# Patient Record
Sex: FEMALE | Race: OTHER | NOT HISPANIC OR LATINO
[De-identification: names, ages, dates, MRNs, and addresses within clinical notes are randomized per-mention and may not be internally consistent; named-entity substitution may affect disease eponyms.]

---

## 2017-12-09 PROBLEM — Z00.00 ENCOUNTER FOR PREVENTIVE HEALTH EXAMINATION: Status: ACTIVE | Noted: 2017-12-09

## 2018-01-08 ENCOUNTER — APPOINTMENT (OUTPATIENT)
Dept: OPHTHALMOLOGY | Facility: CLINIC | Age: 78
End: 2018-01-08
Payer: MEDICARE

## 2018-01-08 PROCEDURE — 92012 INTRM OPH EXAM EST PATIENT: CPT

## 2018-12-10 ENCOUNTER — APPOINTMENT (OUTPATIENT)
Dept: OPHTHALMOLOGY | Facility: CLINIC | Age: 78
End: 2018-12-10

## 2019-02-11 ENCOUNTER — APPOINTMENT (OUTPATIENT)
Dept: OPHTHALMOLOGY | Facility: CLINIC | Age: 79
End: 2019-02-11

## 2020-03-09 ENCOUNTER — NON-APPOINTMENT (OUTPATIENT)
Age: 80
End: 2020-03-09

## 2020-03-09 ENCOUNTER — APPOINTMENT (OUTPATIENT)
Dept: OPHTHALMOLOGY | Facility: CLINIC | Age: 80
End: 2020-03-09
Payer: MEDICARE

## 2020-03-09 PROCEDURE — 92014 COMPRE OPH EXAM EST PT 1/>: CPT

## 2020-03-09 PROCEDURE — 92201 OPSCPY EXTND RTA DRAW UNI/BI: CPT

## 2021-04-19 ENCOUNTER — APPOINTMENT (OUTPATIENT)
Dept: OPHTHALMOLOGY | Facility: CLINIC | Age: 81
End: 2021-04-19
Payer: MEDICARE

## 2021-04-19 ENCOUNTER — NON-APPOINTMENT (OUTPATIENT)
Age: 81
End: 2021-04-19

## 2021-04-19 PROCEDURE — 92201 OPSCPY EXTND RTA DRAW UNI/BI: CPT

## 2021-04-19 PROCEDURE — 92014 COMPRE OPH EXAM EST PT 1/>: CPT

## 2022-05-23 ENCOUNTER — NON-APPOINTMENT (OUTPATIENT)
Age: 82
End: 2022-05-23

## 2022-05-23 ENCOUNTER — APPOINTMENT (OUTPATIENT)
Dept: OPHTHALMOLOGY | Facility: CLINIC | Age: 82
End: 2022-05-23
Payer: MEDICARE

## 2022-05-23 PROCEDURE — 92014 COMPRE OPH EXAM EST PT 1/>: CPT

## 2022-05-23 PROCEDURE — 92201 OPSCPY EXTND RTA DRAW UNI/BI: CPT

## 2024-03-25 ENCOUNTER — APPOINTMENT (OUTPATIENT)
Dept: PULMONOLOGY | Facility: CLINIC | Age: 84
End: 2024-03-25
Payer: MEDICARE

## 2024-03-25 ENCOUNTER — NON-APPOINTMENT (OUTPATIENT)
Age: 84
End: 2024-03-25

## 2024-03-25 VITALS
DIASTOLIC BLOOD PRESSURE: 89 MMHG | SYSTOLIC BLOOD PRESSURE: 138 MMHG | WEIGHT: 100 LBS | OXYGEN SATURATION: 91 % | HEIGHT: 65 IN | BODY MASS INDEX: 16.66 KG/M2 | HEART RATE: 115 BPM

## 2024-03-25 DIAGNOSIS — Z01.812 ENCOUNTER FOR PREPROCEDURAL LABORATORY EXAMINATION: ICD-10-CM

## 2024-03-25 DIAGNOSIS — E78.5 HYPERLIPIDEMIA, UNSPECIFIED: ICD-10-CM

## 2024-03-25 DIAGNOSIS — E03.9 HYPOTHYROIDISM, UNSPECIFIED: ICD-10-CM

## 2024-03-25 LAB — POCT - HEMOGLOBIN (HGB), QUANTITATIVE, TRANSCUTANEOUS: 15.3

## 2024-03-25 PROCEDURE — 94060 EVALUATION OF WHEEZING: CPT

## 2024-03-25 PROCEDURE — ZZZZZ: CPT

## 2024-03-25 PROCEDURE — 94664 DEMO&/EVAL PT USE INHALER: CPT | Mod: 59

## 2024-03-25 PROCEDURE — 94729 DIFFUSING CAPACITY: CPT

## 2024-03-25 PROCEDURE — 71046 X-RAY EXAM CHEST 2 VIEWS: CPT

## 2024-03-25 PROCEDURE — 94618 PULMONARY STRESS TESTING: CPT

## 2024-03-25 PROCEDURE — 99204 OFFICE O/P NEW MOD 45 MIN: CPT | Mod: 25

## 2024-03-25 PROCEDURE — 94726 PLETHYSMOGRAPHY LUNG VOLUMES: CPT

## 2024-03-25 PROCEDURE — 88738 HGB QUANT TRANSCUTANEOUS: CPT

## 2024-03-25 RX ORDER — LEVOTHYROXINE SODIUM 0.07 MG/1
75 TABLET ORAL
Refills: 0 | Status: ACTIVE | COMMUNITY
Start: 2024-03-25

## 2024-03-25 RX ORDER — SIMVASTATIN 20 MG/1
20 TABLET, FILM COATED ORAL
Refills: 0 | Status: ACTIVE | COMMUNITY
Start: 2024-03-25

## 2024-03-25 NOTE — ASSESSMENT
[FreeTextEntry1] : Ms. VALENTINE IGLESIAS is an 84 year old female, history of emphysema And chronic shortness of breath.  Reports severe dyspnea.  Has some airways disease on PFT but majority of problem appears to be related to severe gas exchange impairment.  Likely has severe emphysema.  Her x-ray shows inhomogeneous Emphysema with upper lobe predominance.  Note the large discrepancy between nitrogen lung volumes and box volumes suggesting the presence of noncommunicating gas within the thorax.  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 She may be a candidate for bullectomy or endobronchial valve placement.  Recommend chest CT for further evaluation.Did give patient trial of inhalers.  Trelegy samples provided and patient instructed in use.

## 2024-03-25 NOTE — REASON FOR VISIT
[Initial] : an initial visit [Shortness of Breath] : shortness of breath [COPD] : COPD [Emphysema] : emphysema [TextBox_44] : Emphysema

## 2024-03-25 NOTE — PROCEDURE
[FreeTextEntry1] : Chest x-ray demonstrates severe upper lobe emphysema right greater than left no change from outside chest x-ray 2019 reviewed.  6-minute walk demonstrates minimal O2 desaturation on exertion.  Venipuncture with labs drawn in office.   PFT demonstrates Moderate airways obstruction and severe gas exchange impairment. Lung volumes performed with nitrogen technique and box technique showed a significant discordance with significantly higher lung volumes noted with box technique

## 2024-03-25 NOTE — HISTORY OF PRESENT ILLNESS
[Former] : former [Never] : never [TextBox_4] : VALENTINE IGLESIAS is a 84 year female, with history of emphysema, who presents to the office for an initial evaluation. Patient reports of having worsening dyspnea on exertion that started 6 months ago. She denies of having any symptoms of a cough. Patient reports that her symptoms were not significant prior to 6 months ago. She states that he was never advised to use an O2 concentrator. Patient denies of having any known Hx of pneumonia. Patient denies of having any known drug related allergies. Recently received a cardiac evaluation which was unremarkable.She was told of a diagnosis of emphysema several years ago and she is a former heavy smoker. [TextBox_13] : >40 [YearQuit] : 2021

## 2024-03-25 NOTE — ADDENDUM
[FreeTextEntry1] : I, Serjioayala Yuliya, acted solely as a scribe for Dr. Sedrick Lozano M.D. on this date 03/25/2024.   All medical record entries made by the Scribe were at my, Dr. Sedrick Lozano M.D., direction and personally dictated by me on 03/25/2024. I have reviewed the chart and agree that the record accurately reflects my personal performance of the history, physical exam, assessment and plan. I have also personally directed, reviewed, and agreed with the chart.

## 2024-03-26 ENCOUNTER — NON-APPOINTMENT (OUTPATIENT)
Age: 84
End: 2024-03-26

## 2024-03-26 LAB — A1AT SERPL-MCNC: 214 MG/DL

## 2024-03-27 LAB — NT-PROBNP SERPL-MCNC: 192 PG/ML

## 2024-04-03 ENCOUNTER — APPOINTMENT (OUTPATIENT)
Dept: PULMONOLOGY | Facility: CLINIC | Age: 84
End: 2024-04-03

## 2024-04-08 LAB
ANNOTATION COMMENT IMP: NORMAL
ELECTRONIC SIGNATURE: NORMAL
SERPINA1 GENE MUT TESTED BLD/T: NORMAL

## 2024-04-11 ENCOUNTER — OUTPATIENT (OUTPATIENT)
Dept: OUTPATIENT SERVICES | Facility: HOSPITAL | Age: 84
LOS: 1 days | End: 2024-04-11
Payer: MEDICARE

## 2024-04-11 ENCOUNTER — APPOINTMENT (OUTPATIENT)
Dept: CT IMAGING | Facility: IMAGING CENTER | Age: 84
End: 2024-04-11
Payer: MEDICARE

## 2024-04-11 DIAGNOSIS — R06.02 SHORTNESS OF BREATH: ICD-10-CM

## 2024-04-11 PROCEDURE — 71250 CT THORAX DX C-: CPT

## 2024-04-11 PROCEDURE — 71250 CT THORAX DX C-: CPT | Mod: 26,MH

## 2024-04-21 ENCOUNTER — NON-APPOINTMENT (OUTPATIENT)
Age: 84
End: 2024-04-21

## 2024-05-02 ENCOUNTER — APPOINTMENT (OUTPATIENT)
Dept: PULMONOLOGY | Facility: CLINIC | Age: 84
End: 2024-05-02
Payer: MEDICARE

## 2024-05-02 VITALS
DIASTOLIC BLOOD PRESSURE: 80 MMHG | OXYGEN SATURATION: 91 % | RESPIRATION RATE: 16 BRPM | HEART RATE: 100 BPM | SYSTOLIC BLOOD PRESSURE: 128 MMHG

## 2024-05-02 PROCEDURE — 94010 BREATHING CAPACITY TEST: CPT

## 2024-05-02 PROCEDURE — 99213 OFFICE O/P EST LOW 20 MIN: CPT | Mod: 25

## 2024-05-02 NOTE — REASON FOR VISIT
[Follow-Up] : a follow-up visit [COPD] : COPD [Emphysema] : emphysema [Shortness of Breath] : shortness of breath [Abnormal CXR/ Chest CT] : an abnormal CXR/ chest CT

## 2024-05-03 NOTE — PROCEDURE
[FreeTextEntry1] : Spirometry demonstrates small interval improvement compared to prior.  Chest CT reviewed extensive upper lobe emphysematous and bullous changes with compressive atelectasis at bases

## 2024-05-03 NOTE — ADDENDUM
[FreeTextEntry1] : I, Serjioaayla Yuliya, acted solely as a scribe for Dr. Sedrick Lozano M.D. on this date 05/02/2024.   All medical record entries made by the Scribe were at my, Dr. Sedrick Lozano M.D., direction and personally dictated by me on 05/02/2024. I have reviewed the chart and agree that the record accurately reflects my personal performance of the history, physical exam, assessment and plan. I have also personally directed, reviewed, and agreed with the chart.

## 2024-05-03 NOTE — HISTORY OF PRESENT ILLNESS
[Former] : former [Never] : never [TextBox_4] : Prior: Patient reports of having worsening dyspnea on exertion that started 6 months ago. She denies of having any symptoms of a cough. Patient reports that her symptoms were not significant prior to 6 months ago. She states that he was never advised to use an O2 concentrator. Patient denies of having any known Hx of pneumonia. Patient denies of having any known drug related allergies. Recently received a cardiac evaluation which was unremarkable.She was told of a diagnosis of emphysema several years ago and she is a former heavy smoker.  Current: VALENTINE IGLESIAS is a 84 year old female, with history of emphysema, who presents to the office for follow up evaluation. Patient reports that she continues to have symptoms of dyspnea on exertion which has not changed since her last visit. Patient denies of having any symptoms of phlegm or mucus. She states that she continues to take Trelegy on a daily basis with minimal change to her respiratory symptoms. [TextBox_13] : >40 [YearQuit] : 2021

## 2024-05-03 NOTE — ASSESSMENT
[FreeTextEntry1] : Ms. VALENTINE IGLESIAS is an 84 year old female, history of emphysema. Improved lung function indicated in spirometry today although continues to have symptoms of dyspnea on exertion after trial of Trelegy. Continue Trelegy 100 mcg 1 puff once a day for COPD treatment unless it is cost prohibitive.   Has extensive upper lobe emphysematous changes with compressive atelectasis at the bases.  Will have imaging reviewed by interventional pulmonary for possibility of endobronchial valve treatment and will refer if appropriate  Follow up in 3 months.

## 2024-05-20 ENCOUNTER — APPOINTMENT (OUTPATIENT)
Dept: PULMONOLOGY | Facility: CLINIC | Age: 84
End: 2024-05-20
Payer: MEDICARE

## 2024-05-20 VITALS
RESPIRATION RATE: 14 BRPM | OXYGEN SATURATION: 91 % | HEIGHT: 64 IN | WEIGHT: 99 LBS | DIASTOLIC BLOOD PRESSURE: 69 MMHG | BODY MASS INDEX: 16.9 KG/M2 | TEMPERATURE: 97.3 F | HEART RATE: 97 BPM | SYSTOLIC BLOOD PRESSURE: 104 MMHG

## 2024-05-20 DIAGNOSIS — R06.02 SHORTNESS OF BREATH: ICD-10-CM

## 2024-05-20 DIAGNOSIS — J43.2 CENTRILOBULAR EMPHYSEMA: ICD-10-CM

## 2024-05-20 PROCEDURE — 99205 OFFICE O/P NEW HI 60 MIN: CPT

## 2024-06-14 RX ORDER — FLUTICASONE FUROATE, UMECLIDINIUM BROMIDE AND VILANTEROL TRIFENATATE 100; 62.5; 25 UG/1; UG/1; UG/1
100-62.5-25 POWDER RESPIRATORY (INHALATION)
Qty: 1 | Refills: 3 | Status: ACTIVE | COMMUNITY
Start: 2024-03-25 | End: 1900-01-01

## 2024-06-24 NOTE — HISTORY OF PRESENT ILLNESS
[Former] : former [>= 20 pack years] : >= 20 pack years [TextBox_4] : Interventional Pulmonology Consultation/Visit Note   85 y/o F with with past medical history of COPD and advanced emphysema who presents for a consultation for bronchoscopic lung volume reduction for emphysema. The patient is currently being managed for COPD. In terms of medical management, the patient is already optimized on medical management. They are currently on a combination therapy of LABA/ICS/LAMA. Patient still continues to feel short of breath and dyspneic despite medical optimization, that is not attributable to any other etiologies. Thus, the patient was referred to assess candidacy for bronchoscopic lung volume reduction for dyspnea and symptomatic benefit.   History of exacerbations: No recent History of thoracic surgical interventions: No History of pneumothorax: No Pulmonary rehabilitation: No [TextBox_13] : >40 [YearQuit] : 2021

## 2024-06-24 NOTE — CONSULT LETTER
[Dear  ___] : Dear  [unfilled], [Consult Letter:] : I had the pleasure of evaluating your patient, [unfilled]. [Please see my note below.] : Please see my note below. [Consult Closing:] : Thank you very much for allowing me to participate in the care of this patient.  If you have any questions, please do not hesitate to contact me. [Sincerely,] : Sincerely, [FreeTextEntry3] : Nilesh Giron MD Director of Interventional Pulmonology & Bronchoscopy Associate Professor of Medicine, Cardiovascular & Thoracic Surgery Division of Pulmonary, Critical Care & Sleep Medicine [facility].

## 2024-06-24 NOTE — REASON FOR VISIT
[Consultation] : a consultation [TextBox_44] : IP evaluation for bronchoscopic lung volume reduction [TextBox_13] : Dr. Lozano

## 2024-06-24 NOTE — ASSESSMENT
[FreeTextEntry1] :   Patient selection criteria for Endobronchial/Intrabronchial Valve for Bronchoscopic Lung Volume Reduction:  Emphysema by CT: Yes Stable with <20mg of prednisone daily: Yes RV: >175% predicted (>200% if homogenous) - 153% FEV1: 15-45% predicted - 49% TLC: >100% predicted. - 104% 6MWD: 100-500m - TBD Smoking Status:  Former StratX/SeleCT report for fissue integrity: >95% Homogenous vs Heterogenous: Heterogenous PcO2: TBD Echo: Pending VQ: Pending  Current devices (Zephry/Spiration valves) to achieve bronchoscopic lung volume reduction were discussed with the patient and family. In addition, the procedure, pre-operative evaluation, post-operative care was discussed. The benefits of the procedure including improvement in FEV1, 6MWD, SGRQ based on current studies were discussed. Also, complications associated with the procedure including pneumothorax and post-operative admission for minimum 3 days for the monitoring was discussed. Need for further procedures including valve removal if no efficacy or complications was discussed. Clinical benefit based on studies despite PTX was discussed. Patient/family demonstrated understanding. All questions were answered. Patient/family agreeable to proceed with evaluation to assess candidacy.  We will obtain VQ scan based on StratX report to assess the appropriate target given significant bilateral upper lobar emphysema with integral fissure on the right and left. In addition, we will obtain 6MWD as well as echo to r/o PHTN. She will eventually need evaluation for PCO2 as well via ABG to ensure no hypercapnia.   RTC once above completed to further assess candidacy. All questions answered.

## 2024-08-06 ENCOUNTER — APPOINTMENT (OUTPATIENT)
Dept: PULMONOLOGY | Facility: CLINIC | Age: 84
End: 2024-08-06

## 2024-08-06 PROCEDURE — 94010 BREATHING CAPACITY TEST: CPT

## 2024-08-06 PROCEDURE — 99213 OFFICE O/P EST LOW 20 MIN: CPT | Mod: 25

## 2024-08-06 NOTE — ADDENDUM
[FreeTextEntry1] : I, Serjioayala Yuliya, acted solely as a scribe for Dr. Sedrick Lozano M.D. on this date 05/02/2024.   All medical record entries made by the Scribe were at my, Dr. Sedrick Lozano M.D., direction and personally dictated by me on 05/02/2024. I have reviewed the chart and agree that the record accurately reflects my personal performance of the history, physical exam, assessment and plan. I have also personally directed, reviewed, and agreed with the chart.

## 2024-08-06 NOTE — ASSESSMENT
[FreeTextEntry1] : Ms. VALENTINE IGLESIAS is an 84 year old female, history of emphysema. Improved lung function indicated in spirometry today although continues to have symptoms of dyspnea on exertion after trial of Trelegy. Continue Trelegy 100 mcg 1 puff once a day for COPD treatment unless it is cost prohibitive.   Has decided not to pursue valve treatment at this time.  Follow-up in 3

## 2024-08-06 NOTE — HISTORY OF PRESENT ILLNESS
[Former] : former [Never] : never [TextBox_4] : Prior: Patient reports of having worsening dyspnea on exertion that started 6 months ago. She denies of having any symptoms of a cough. Patient reports that her symptoms were not significant prior to 6 months ago. She states that he was never advised to use an O2 concentrator. Patient denies of having any known Hx of pneumonia. Patient denies of having any known drug related allergies. Recently received a cardiac evaluation which was unremarkable.She was told of a diagnosis of emphysema several years ago and she is a former heavy smoker.  Last office visit VALENTINE IGLESIAS is a 84 year old female, with history of emphysema, who presents to the office for follow up evaluation. Patient reports that she continues to have symptoms of dyspnea on exertion which has not changed since her last visit. Patient denies of having any symptoms of phlegm or mucus. She states that she continues to take Trelegy on a daily basis with minimal change to her respiratory symptoms.  Current: Continues to benefit from Trelegy reports shortness of breath is stable concerned about cost issue but does not have alternative [YearQuit] : 2021 [TextBox_13] : >40

## 2024-08-06 NOTE — REASON FOR VISIT
[Follow-Up] : a follow-up visit [Abnormal CXR/ Chest CT] : an abnormal CXR/ chest CT [COPD] : COPD [Emphysema] : emphysema [Shortness of Breath] : shortness of breath

## 2024-11-05 ENCOUNTER — APPOINTMENT (OUTPATIENT)
Dept: PULMONOLOGY | Facility: CLINIC | Age: 84
End: 2024-11-05
Payer: MEDICARE

## 2024-11-05 VITALS — DIASTOLIC BLOOD PRESSURE: 91 MMHG | HEART RATE: 81 BPM | OXYGEN SATURATION: 93 % | SYSTOLIC BLOOD PRESSURE: 155 MMHG

## 2024-11-05 DIAGNOSIS — J43.2 CENTRILOBULAR EMPHYSEMA: ICD-10-CM

## 2024-11-05 DIAGNOSIS — T30.0 BURN OF UNSPECIFIED BODY REGION, UNSPECIFIED DEGREE: ICD-10-CM

## 2024-11-05 DIAGNOSIS — R06.02 SHORTNESS OF BREATH: ICD-10-CM

## 2024-11-05 DIAGNOSIS — R05.2 SUBACUTE COUGH: ICD-10-CM

## 2024-11-05 DIAGNOSIS — R93.89 ABNORMAL FINDINGS ON DIAGNOSTIC IMAGING OF OTHER SPECIFIED BODY STRUCTURES: ICD-10-CM

## 2024-11-05 DIAGNOSIS — R05.3 CHRONIC COUGH: ICD-10-CM

## 2024-11-05 LAB — POCT - HEMOGLOBIN (HGB), QUANTITATIVE, TRANSCUTANEOUS: 12.3

## 2024-11-05 PROCEDURE — 94729 DIFFUSING CAPACITY: CPT

## 2024-11-05 PROCEDURE — 94010 BREATHING CAPACITY TEST: CPT

## 2024-11-05 PROCEDURE — ZZZZZ: CPT

## 2024-11-05 PROCEDURE — 94727 GAS DIL/WSHOT DETER LNG VOL: CPT

## 2024-11-05 PROCEDURE — 71046 X-RAY EXAM CHEST 2 VIEWS: CPT

## 2024-11-05 PROCEDURE — 99213 OFFICE O/P EST LOW 20 MIN: CPT | Mod: 25

## 2024-11-05 PROCEDURE — 88738 HGB QUANT TRANSCUTANEOUS: CPT

## 2024-11-05 RX ORDER — CEFUROXIME AXETIL 500 MG/1
500 TABLET, FILM COATED ORAL
Qty: 20 | Refills: 0 | Status: ACTIVE | COMMUNITY
Start: 2024-11-05 | End: 1900-01-01

## 2024-11-06 PROBLEM — R93.89 ABNORMAL CHEST X-RAY: Status: ACTIVE | Noted: 2024-11-06

## 2024-11-06 PROBLEM — T30.0 BURN: Status: ACTIVE | Noted: 2024-11-06

## 2024-11-06 PROBLEM — R05.2 SUBACUTE COUGH: Status: ACTIVE | Noted: 2024-11-06

## 2024-11-06 PROBLEM — R05.3 CHRONIC COUGH: Status: ACTIVE | Noted: 2024-11-06

## 2024-11-14 ENCOUNTER — OUTPATIENT (OUTPATIENT)
Dept: OUTPATIENT SERVICES | Facility: HOSPITAL | Age: 84
LOS: 1 days | End: 2024-11-14
Payer: MEDICARE

## 2024-11-14 ENCOUNTER — APPOINTMENT (OUTPATIENT)
Dept: CT IMAGING | Facility: IMAGING CENTER | Age: 84
End: 2024-11-14

## 2024-11-14 DIAGNOSIS — R06.02 SHORTNESS OF BREATH: ICD-10-CM

## 2024-11-14 DIAGNOSIS — J43.2 CENTRILOBULAR EMPHYSEMA: ICD-10-CM

## 2024-11-14 PROCEDURE — 71250 CT THORAX DX C-: CPT | Mod: 26,MH

## 2024-11-14 PROCEDURE — 71250 CT THORAX DX C-: CPT

## 2024-11-25 RX ORDER — DOXYCYCLINE 100 MG/1
100 CAPSULE ORAL
Qty: 20 | Refills: 0 | Status: ACTIVE | COMMUNITY
Start: 2024-11-25 | End: 1900-01-01

## 2024-12-03 ENCOUNTER — APPOINTMENT (OUTPATIENT)
Dept: PULMONOLOGY | Facility: CLINIC | Age: 84
End: 2024-12-03
Payer: MEDICARE

## 2024-12-03 ENCOUNTER — LABORATORY RESULT (OUTPATIENT)
Age: 84
End: 2024-12-03

## 2024-12-03 ENCOUNTER — NON-APPOINTMENT (OUTPATIENT)
Age: 84
End: 2024-12-03

## 2024-12-03 VITALS — HEART RATE: 85 BPM | OXYGEN SATURATION: 92 % | SYSTOLIC BLOOD PRESSURE: 140 MMHG | DIASTOLIC BLOOD PRESSURE: 88 MMHG

## 2024-12-03 VITALS — HEART RATE: 86 BPM | DIASTOLIC BLOOD PRESSURE: 76 MMHG | SYSTOLIC BLOOD PRESSURE: 112 MMHG

## 2024-12-03 DIAGNOSIS — R05.2 SUBACUTE COUGH: ICD-10-CM

## 2024-12-03 DIAGNOSIS — R06.02 SHORTNESS OF BREATH: ICD-10-CM

## 2024-12-03 DIAGNOSIS — J43.2 CENTRILOBULAR EMPHYSEMA: ICD-10-CM

## 2024-12-03 DIAGNOSIS — R93.89 ABNORMAL FINDINGS ON DIAGNOSTIC IMAGING OF OTHER SPECIFIED BODY STRUCTURES: ICD-10-CM

## 2024-12-03 DIAGNOSIS — E03.9 HYPOTHYROIDISM, UNSPECIFIED: ICD-10-CM

## 2024-12-03 LAB
BASOPHILS # BLD AUTO: 0.06 K/UL
BASOPHILS NFR BLD AUTO: 0.6 %
EOSINOPHIL # BLD AUTO: 0.03 K/UL
EOSINOPHIL NFR BLD AUTO: 0.3 %
HCT VFR BLD CALC: 43.6 %
HGB BLD-MCNC: 14.8 G/DL
IMM GRANULOCYTES NFR BLD AUTO: 0.6 %
LYMPHOCYTES # BLD AUTO: 1.14 K/UL
LYMPHOCYTES NFR BLD AUTO: 11.9 %
MAN DIFF?: NORMAL
MCHC RBC-ENTMCNC: 33 PG
MCHC RBC-ENTMCNC: 33.9 G/DL
MCV RBC AUTO: 97.3 FL
MONOCYTES # BLD AUTO: 0.78 K/UL
MONOCYTES NFR BLD AUTO: 8.1 %
NEUTROPHILS # BLD AUTO: 7.54 K/UL
NEUTROPHILS NFR BLD AUTO: 78.5 %
PLATELET # BLD AUTO: 341 K/UL
RBC # BLD: 4.48 M/UL
RBC # FLD: 15.2 %
TSH SERPL-ACNC: 5.06 UIU/ML
WBC # FLD AUTO: 9.61 K/UL

## 2024-12-03 PROCEDURE — 36415 COLL VENOUS BLD VENIPUNCTURE: CPT

## 2024-12-03 PROCEDURE — 77085 DXA BONE DENSITY AXL VRT FX: CPT

## 2024-12-03 PROCEDURE — 99214 OFFICE O/P EST MOD 30 MIN: CPT

## 2024-12-04 LAB
ALBUMIN SERPL ELPH-MCNC: 4.6 G/DL
ALP BLD-CCNC: 88 U/L
ALT SERPL-CCNC: 22 U/L
ANION GAP SERPL CALC-SCNC: 18 MMOL/L
AST SERPL-CCNC: 27 U/L
BILIRUB SERPL-MCNC: 0.5 MG/DL
BUN SERPL-MCNC: 17 MG/DL
CALCIUM SERPL-MCNC: 10.4 MG/DL
CHLORIDE SERPL-SCNC: 95 MMOL/L
CHOLEST SERPL-MCNC: 235 MG/DL
CO2 SERPL-SCNC: 21 MMOL/L
CREAT SERPL-MCNC: 0.69 MG/DL
EGFR: 86 ML/MIN/1.73M2
HDLC SERPL-MCNC: 121 MG/DL
LDLC SERPL CALC-MCNC: 101 MG/DL
NONHDLC SERPL-MCNC: 115 MG/DL
POTASSIUM SERPL-SCNC: 4.9 MMOL/L
PROT SERPL-MCNC: 6.9 G/DL
SODIUM SERPL-SCNC: 134 MMOL/L
TRIGL SERPL-MCNC: 79 MG/DL

## 2024-12-19 ENCOUNTER — NON-APPOINTMENT (OUTPATIENT)
Age: 84
End: 2024-12-19

## 2024-12-19 ENCOUNTER — APPOINTMENT (OUTPATIENT)
Dept: INTERNAL MEDICINE | Facility: CLINIC | Age: 84
End: 2024-12-19
Payer: MEDICARE

## 2024-12-19 VITALS
DIASTOLIC BLOOD PRESSURE: 100 MMHG | SYSTOLIC BLOOD PRESSURE: 150 MMHG | OXYGEN SATURATION: 93 % | RESPIRATION RATE: 94 BRPM

## 2024-12-19 VITALS
WEIGHT: 91 LBS | BODY MASS INDEX: 15.54 KG/M2 | OXYGEN SATURATION: 93 % | TEMPERATURE: 97.7 F | HEIGHT: 64 IN | HEART RATE: 88 BPM

## 2024-12-19 DIAGNOSIS — R63.4 ABNORMAL WEIGHT LOSS: ICD-10-CM

## 2024-12-19 DIAGNOSIS — M43.9 DEFORMING DORSOPATHY, UNSPECIFIED: ICD-10-CM

## 2024-12-19 DIAGNOSIS — E87.1 HYPO-OSMOLALITY AND HYPONATREMIA: ICD-10-CM

## 2024-12-19 DIAGNOSIS — E03.9 HYPOTHYROIDISM, UNSPECIFIED: ICD-10-CM

## 2024-12-19 DIAGNOSIS — E78.5 HYPERLIPIDEMIA, UNSPECIFIED: ICD-10-CM

## 2024-12-19 DIAGNOSIS — R94.31 ABNORMAL ELECTROCARDIOGRAM [ECG] [EKG]: ICD-10-CM

## 2024-12-19 DIAGNOSIS — R05.2 SUBACUTE COUGH: ICD-10-CM

## 2024-12-19 DIAGNOSIS — M81.0 AGE-RELATED OSTEOPOROSIS W/OUT CURRENT PATHOLOGICAL FRACTURE: ICD-10-CM

## 2024-12-19 DIAGNOSIS — Z12.9 ENCOUNTER FOR SCREENING FOR MALIGNANT NEOPLASM, SITE UNSPECIFIED: ICD-10-CM

## 2024-12-19 DIAGNOSIS — Z87.891 PERSONAL HISTORY OF NICOTINE DEPENDENCE: ICD-10-CM

## 2024-12-19 DIAGNOSIS — R91.1 SOLITARY PULMONARY NODULE: ICD-10-CM

## 2024-12-19 DIAGNOSIS — J43.2 CENTRILOBULAR EMPHYSEMA: ICD-10-CM

## 2024-12-19 DIAGNOSIS — Z13.228 ENCOUNTER FOR SCREENING FOR OTHER METABOLIC DISORDERS: ICD-10-CM

## 2024-12-19 DIAGNOSIS — Z00.00 ENCOUNTER FOR GENERAL ADULT MEDICAL EXAMINATION W/OUT ABNORMAL FINDINGS: ICD-10-CM

## 2024-12-19 DIAGNOSIS — R79.89 OTHER SPECIFIED ABNORMAL FINDINGS OF BLOOD CHEMISTRY: ICD-10-CM

## 2024-12-19 DIAGNOSIS — R03.0 ELEVATED BLOOD-PRESSURE READING, W/OUT DIAGNOSIS OF HYPERTENSION: ICD-10-CM

## 2024-12-19 PROCEDURE — G0438: CPT

## 2024-12-19 PROCEDURE — 93000 ELECTROCARDIOGRAM COMPLETE: CPT

## 2024-12-19 PROCEDURE — 99204 OFFICE O/P NEW MOD 45 MIN: CPT | Mod: 25

## 2024-12-20 DIAGNOSIS — R79.89 OTHER SPECIFIED ABNORMAL FINDINGS OF BLOOD CHEMISTRY: ICD-10-CM

## 2024-12-20 LAB
25(OH)D3 SERPL-MCNC: 77.6 NG/ML
ALBUMIN SERPL ELPH-MCNC: 4.6 G/DL
ALP BLD-CCNC: 80 U/L
ALT SERPL-CCNC: 24 U/L
ANION GAP SERPL CALC-SCNC: 18 MMOL/L
APPEARANCE: CLEAR
AST SERPL-CCNC: 23 U/L
BACTERIA: NEGATIVE /HPF
BASOPHILS # BLD AUTO: 0.06 K/UL
BASOPHILS NFR BLD AUTO: 0.6 %
BILIRUB SERPL-MCNC: 0.5 MG/DL
BILIRUBIN URINE: NEGATIVE
BLOOD URINE: NEGATIVE
BUN SERPL-MCNC: 16 MG/DL
CALCIUM SERPL-MCNC: 10.6 MG/DL
CAST: 0 /LPF
CHLORIDE SERPL-SCNC: 96 MMOL/L
CHOLEST SERPL-MCNC: 238 MG/DL
CK SERPL-CCNC: 89 U/L
CO2 SERPL-SCNC: 21 MMOL/L
COLOR: YELLOW
CREAT SERPL-MCNC: 0.64 MG/DL
CREAT SPEC-SCNC: 41 MG/DL
EGFR: 88 ML/MIN/1.73M2
EOSINOPHIL # BLD AUTO: 0.07 K/UL
EOSINOPHIL NFR BLD AUTO: 0.7 %
EPITHELIAL CELLS: 0 /HPF
ESTIMATED AVERAGE GLUCOSE: 105 MG/DL
FERRITIN SERPL-MCNC: 87 NG/ML
FOLATE SERPL-MCNC: 18.1 NG/ML
GLUCOSE QUALITATIVE U: NEGATIVE MG/DL
GLUCOSE SERPL-MCNC: 94 MG/DL
HBA1C MFR BLD HPLC: 5.3 %
HCT VFR BLD CALC: 44.1 %
HDLC SERPL-MCNC: 119 MG/DL
HGB BLD-MCNC: 14.3 G/DL
IMM GRANULOCYTES NFR BLD AUTO: 0.4 %
IRON SATN MFR SERPL: 17 %
IRON SERPL-MCNC: 61 UG/DL
KETONES URINE: NEGATIVE MG/DL
LDLC SERPL CALC-MCNC: 103 MG/DL
LEUKOCYTE ESTERASE URINE: NEGATIVE
LYMPHOCYTES # BLD AUTO: 0.96 K/UL
LYMPHOCYTES NFR BLD AUTO: 9.5 %
MAN DIFF?: NORMAL
MCHC RBC-ENTMCNC: 32.4 G/DL
MCHC RBC-ENTMCNC: 32.4 PG
MCV RBC AUTO: 99.8 FL
MICROALBUMIN 24H UR DL<=1MG/L-MCNC: 1.8 MG/DL
MICROALBUMIN/CREAT 24H UR-RTO: 45 MG/G
MICROSCOPIC-UA: NORMAL
MONOCYTES # BLD AUTO: 0.74 K/UL
MONOCYTES NFR BLD AUTO: 7.4 %
NEUTROPHILS # BLD AUTO: 8.19 K/UL
NEUTROPHILS NFR BLD AUTO: 81.4 %
NITRITE URINE: NEGATIVE
NONHDLC SERPL-MCNC: 119 MG/DL
PH URINE: 7
PLATELET # BLD AUTO: 317 K/UL
POTASSIUM SERPL-SCNC: 4.5 MMOL/L
PROT SERPL-MCNC: 6.9 G/DL
PROTEIN URINE: NEGATIVE MG/DL
RBC # BLD: 4.42 M/UL
RBC # FLD: 14.7 %
RED BLOOD CELLS URINE: 1 /HPF
SODIUM SERPL-SCNC: 134 MMOL/L
SPECIFIC GRAVITY URINE: 1.01
T3FREE SERPL-MCNC: 2.4 PG/ML
T4 FREE SERPL-MCNC: 2 NG/DL
TIBC SERPL-MCNC: 372 UG/DL
TRIGL SERPL-MCNC: 97 MG/DL
TSH SERPL-ACNC: 3.67 UIU/ML
UIBC SERPL-MCNC: 311 UG/DL
UROBILINOGEN URINE: 0.2 MG/DL
VIT B12 SERPL-MCNC: 1480 PG/ML
WBC # FLD AUTO: 10.06 K/UL
WHITE BLOOD CELLS URINE: 0 /HPF

## 2024-12-21 ENCOUNTER — APPOINTMENT (OUTPATIENT)
Dept: CT IMAGING | Facility: IMAGING CENTER | Age: 84
End: 2024-12-21

## 2024-12-21 ENCOUNTER — NON-APPOINTMENT (OUTPATIENT)
Age: 84
End: 2024-12-21

## 2024-12-22 PROBLEM — E87.1 HYPONATREMIA: Status: ACTIVE | Noted: 2024-12-19

## 2024-12-22 PROBLEM — Z12.9 CANCER SCREENING: Status: ACTIVE | Noted: 2024-12-19

## 2024-12-22 PROBLEM — R63.4 WEIGHT LOSS: Status: ACTIVE | Noted: 2024-12-19

## 2024-12-22 PROBLEM — M43.9 COMPRESSION DEFORMITY OF VERTEBRA: Status: ACTIVE | Noted: 2024-12-19

## 2024-12-22 PROBLEM — R79.89 ELEVATED TSH: Status: ACTIVE | Noted: 2024-12-19

## 2024-12-22 PROBLEM — R03.0 ELEVATED BLOOD PRESSURE READING: Status: ACTIVE | Noted: 2024-12-19

## 2024-12-22 PROBLEM — R94.31 ABNORMAL EKG: Status: ACTIVE | Noted: 2024-12-19

## 2024-12-22 PROBLEM — M81.0 OSTEOPOROSIS: Status: ACTIVE | Noted: 2024-12-19

## 2024-12-22 PROBLEM — R91.1 LUNG NODULE: Status: ACTIVE | Noted: 2024-12-19

## 2024-12-22 PROBLEM — Z13.228 SCREENING FOR METABOLIC DISORDER: Status: ACTIVE | Noted: 2024-12-19

## 2024-12-22 LAB
CALCIUM SERPL-MCNC: 10.3 MG/DL
PARATHYROID HORMONE INTACT: 23 PG/ML

## 2024-12-24 ENCOUNTER — APPOINTMENT (OUTPATIENT)
Dept: CARDIOLOGY | Facility: CLINIC | Age: 84
End: 2024-12-24

## 2024-12-28 ENCOUNTER — NON-APPOINTMENT (OUTPATIENT)
Age: 84
End: 2024-12-28

## 2025-01-02 ENCOUNTER — TRANSCRIPTION ENCOUNTER (OUTPATIENT)
Age: 85
End: 2025-01-02

## 2025-01-02 ENCOUNTER — APPOINTMENT (OUTPATIENT)
Dept: CARDIOLOGY | Facility: CLINIC | Age: 85
End: 2025-01-02
Payer: MEDICARE

## 2025-01-02 VITALS
HEART RATE: 103 BPM | TEMPERATURE: 97.4 F | DIASTOLIC BLOOD PRESSURE: 82 MMHG | BODY MASS INDEX: 15.54 KG/M2 | SYSTOLIC BLOOD PRESSURE: 125 MMHG | WEIGHT: 91 LBS | HEIGHT: 64 IN | OXYGEN SATURATION: 90 %

## 2025-01-02 VITALS — SYSTOLIC BLOOD PRESSURE: 160 MMHG | DIASTOLIC BLOOD PRESSURE: 100 MMHG

## 2025-01-02 DIAGNOSIS — E78.5 HYPERLIPIDEMIA, UNSPECIFIED: ICD-10-CM

## 2025-01-02 DIAGNOSIS — I07.1 RHEUMATIC TRICUSPID INSUFFICIENCY: ICD-10-CM

## 2025-01-02 DIAGNOSIS — I27.20 PULMONARY HYPERTENSION, UNSPECIFIED: ICD-10-CM

## 2025-01-02 DIAGNOSIS — I10 ESSENTIAL (PRIMARY) HYPERTENSION: ICD-10-CM

## 2025-01-02 DIAGNOSIS — R94.31 ABNORMAL ELECTROCARDIOGRAM [ECG] [EKG]: ICD-10-CM

## 2025-01-02 PROCEDURE — 93306 TTE W/DOPPLER COMPLETE: CPT

## 2025-01-02 PROCEDURE — 99204 OFFICE O/P NEW MOD 45 MIN: CPT

## 2025-01-02 PROCEDURE — G2211 COMPLEX E/M VISIT ADD ON: CPT

## 2025-01-02 RX ORDER — LOSARTAN POTASSIUM 25 MG/1
25 TABLET, FILM COATED ORAL
Qty: 30 | Refills: 3 | Status: ACTIVE | COMMUNITY
Start: 2025-01-02 | End: 1900-01-01

## 2025-01-07 ENCOUNTER — APPOINTMENT (OUTPATIENT)
Dept: PULMONOLOGY | Facility: CLINIC | Age: 85
End: 2025-01-07
Payer: MEDICARE

## 2025-01-07 VITALS — HEART RATE: 100 BPM | OXYGEN SATURATION: 90 %

## 2025-01-07 VITALS — OXYGEN SATURATION: 92 %

## 2025-01-07 VITALS — DIASTOLIC BLOOD PRESSURE: 68 MMHG | SYSTOLIC BLOOD PRESSURE: 122 MMHG

## 2025-01-07 DIAGNOSIS — R06.02 SHORTNESS OF BREATH: ICD-10-CM

## 2025-01-07 DIAGNOSIS — J43.2 CENTRILOBULAR EMPHYSEMA: ICD-10-CM

## 2025-01-07 PROCEDURE — 99214 OFFICE O/P EST MOD 30 MIN: CPT | Mod: 25

## 2025-01-07 PROCEDURE — 94010 BREATHING CAPACITY TEST: CPT

## 2025-01-17 ENCOUNTER — TRANSCRIPTION ENCOUNTER (OUTPATIENT)
Age: 85
End: 2025-01-17

## 2025-01-30 ENCOUNTER — TRANSCRIPTION ENCOUNTER (OUTPATIENT)
Age: 85
End: 2025-01-30

## 2025-02-03 ENCOUNTER — APPOINTMENT (OUTPATIENT)
Dept: CARDIOLOGY | Facility: CLINIC | Age: 85
End: 2025-02-03

## 2025-02-06 ENCOUNTER — TRANSCRIPTION ENCOUNTER (OUTPATIENT)
Age: 85
End: 2025-02-06

## 2025-02-18 ENCOUNTER — OUTPATIENT (OUTPATIENT)
Dept: OUTPATIENT SERVICES | Facility: HOSPITAL | Age: 85
LOS: 1 days | End: 2025-02-18
Payer: MEDICARE

## 2025-02-18 ENCOUNTER — APPOINTMENT (OUTPATIENT)
Dept: CT IMAGING | Facility: IMAGING CENTER | Age: 85
End: 2025-02-18
Payer: MEDICARE

## 2025-02-18 DIAGNOSIS — R63.4 ABNORMAL WEIGHT LOSS: ICD-10-CM

## 2025-02-18 PROCEDURE — 74176 CT ABD & PELVIS W/O CONTRAST: CPT | Mod: 26

## 2025-02-18 PROCEDURE — 74176 CT ABD & PELVIS W/O CONTRAST: CPT

## 2025-02-18 PROCEDURE — 75571 CT HRT W/O DYE W/CA TEST: CPT | Mod: 26

## 2025-02-18 PROCEDURE — 75571 CT HRT W/O DYE W/CA TEST: CPT

## 2025-02-21 DIAGNOSIS — J43.9 EMPHYSEMA, UNSPECIFIED: ICD-10-CM

## 2025-03-04 ENCOUNTER — APPOINTMENT (OUTPATIENT)
Dept: CARDIOLOGY | Facility: CLINIC | Age: 85
End: 2025-03-04
Payer: MEDICARE

## 2025-03-04 ENCOUNTER — NON-APPOINTMENT (OUTPATIENT)
Age: 85
End: 2025-03-04

## 2025-03-04 VITALS — HEART RATE: 88 BPM | SYSTOLIC BLOOD PRESSURE: 122 MMHG | DIASTOLIC BLOOD PRESSURE: 72 MMHG | OXYGEN SATURATION: 91 %

## 2025-03-04 VITALS — HEIGHT: 64 IN | WEIGHT: 92 LBS | BODY MASS INDEX: 15.71 KG/M2

## 2025-03-04 DIAGNOSIS — J43.9 EMPHYSEMA, UNSPECIFIED: ICD-10-CM

## 2025-03-04 DIAGNOSIS — I25.10 ATHEROSCLEROTIC HEART DISEASE OF NATIVE CORONARY ARTERY W/OUT ANGINA PECTORIS: ICD-10-CM

## 2025-03-04 DIAGNOSIS — E78.5 HYPERLIPIDEMIA, UNSPECIFIED: ICD-10-CM

## 2025-03-04 DIAGNOSIS — I10 ESSENTIAL (PRIMARY) HYPERTENSION: ICD-10-CM

## 2025-03-04 PROCEDURE — 93000 ELECTROCARDIOGRAM COMPLETE: CPT

## 2025-03-04 PROCEDURE — 99214 OFFICE O/P EST MOD 30 MIN: CPT

## 2025-03-04 RX ORDER — KRILL/OM-3/DHA/EPA/PHOSPHO/AST 1000-230MG
81 CAPSULE ORAL
Qty: 90 | Refills: 1 | Status: ACTIVE | COMMUNITY
Start: 2025-03-04 | End: 1900-01-01

## 2025-03-05 ENCOUNTER — TRANSCRIPTION ENCOUNTER (OUTPATIENT)
Age: 85
End: 2025-03-05

## 2025-03-05 LAB
ALBUMIN SERPL ELPH-MCNC: 4.5 G/DL
ALP BLD-CCNC: 93 U/L
ALT SERPL-CCNC: 22 U/L
ANION GAP SERPL CALC-SCNC: 15 MMOL/L
APO B SERPL-MCNC: 76 MG/DL
AST SERPL-CCNC: 24 U/L
BILIRUB DIRECT SERPL-MCNC: 0.2 MG/DL
BILIRUB INDIRECT SERPL-MCNC: 0.4 MG/DL
BILIRUB SERPL-MCNC: 0.6 MG/DL
BUN SERPL-MCNC: 16 MG/DL
CALCIUM SERPL-MCNC: 10.4 MG/DL
CHLORIDE SERPL-SCNC: 99 MMOL/L
CHOLEST SERPL-MCNC: 220 MG/DL
CK SERPL-CCNC: 103 U/L
CO2 SERPL-SCNC: 22 MMOL/L
CREAT SERPL-MCNC: 0.77 MG/DL
CRP SERPL HS-MCNC: 1.18 MG/L
EGFRCR SERPLBLD CKD-EPI 2021: 76 ML/MIN/1.73M2
ESTIMATED AVERAGE GLUCOSE: 108 MG/DL
GLUCOSE SERPL-MCNC: 96 MG/DL
HBA1C MFR BLD HPLC: 5.4 %
HDLC SERPL-MCNC: 108 MG/DL
LDLC SERPL CALC-MCNC: 92 MG/DL
LDLC SERPL DIRECT ASSAY-MCNC: 89 MG/DL
NONHDLC SERPL-MCNC: 112 MG/DL
POTASSIUM SERPL-SCNC: 5 MMOL/L
PROT SERPL-MCNC: 7.1 G/DL
SODIUM SERPL-SCNC: 135 MMOL/L
TRIGL SERPL-MCNC: 120 MG/DL

## 2025-03-05 RX ORDER — EZETIMIBE 10 MG/1
10 TABLET ORAL
Qty: 90 | Refills: 1 | Status: ACTIVE | COMMUNITY
Start: 2025-03-05 | End: 1900-01-01

## 2025-03-05 RX ORDER — CEFPODOXIME PROXETIL 200 MG/1
200 TABLET, FILM COATED ORAL
Qty: 20 | Refills: 0 | Status: ACTIVE | COMMUNITY
Start: 2025-03-05 | End: 1900-01-01

## 2025-03-05 RX ORDER — AMOXICILLIN AND CLAVULANATE POTASSIUM 875; 125 MG/1; MG/1
875-125 TABLET, COATED ORAL
Qty: 20 | Refills: 0 | Status: ACTIVE | COMMUNITY
Start: 2025-03-05 | End: 1900-01-01

## 2025-03-24 ENCOUNTER — APPOINTMENT (OUTPATIENT)
Dept: ENDOCRINOLOGY | Facility: CLINIC | Age: 85
End: 2025-03-24
Payer: MEDICARE

## 2025-03-24 VITALS
TEMPERATURE: 97 F | RESPIRATION RATE: 18 BRPM | OXYGEN SATURATION: 91 % | BODY MASS INDEX: 15.54 KG/M2 | HEART RATE: 95 BPM | DIASTOLIC BLOOD PRESSURE: 97 MMHG | SYSTOLIC BLOOD PRESSURE: 157 MMHG | WEIGHT: 91 LBS | HEIGHT: 64 IN

## 2025-03-24 DIAGNOSIS — M81.0 AGE-RELATED OSTEOPOROSIS W/OUT CURRENT PATHOLOGICAL FRACTURE: ICD-10-CM

## 2025-03-24 DIAGNOSIS — E03.9 HYPOTHYROIDISM, UNSPECIFIED: ICD-10-CM

## 2025-03-24 PROCEDURE — 99204 OFFICE O/P NEW MOD 45 MIN: CPT

## 2025-03-25 LAB
T4 FREE SERPL-MCNC: 2 NG/DL
TSH SERPL-ACNC: 5.6 UIU/ML

## 2025-03-28 ENCOUNTER — NON-APPOINTMENT (OUTPATIENT)
Age: 85
End: 2025-03-28

## 2025-04-01 ENCOUNTER — RX RENEWAL (OUTPATIENT)
Age: 85
End: 2025-04-01

## 2025-04-01 RX ORDER — FLUTICASONE FUROATE, UMECLIDINIUM BROMIDE AND VILANTEROL TRIFENATATE 100; 62.5; 25 UG/1; UG/1; UG/1
100-62.5-25 POWDER RESPIRATORY (INHALATION)
Qty: 60 | Refills: 3 | Status: ACTIVE | COMMUNITY
Start: 2025-04-01 | End: 1900-01-01

## 2025-04-08 ENCOUNTER — APPOINTMENT (OUTPATIENT)
Dept: PULMONOLOGY | Facility: CLINIC | Age: 85
End: 2025-04-08
Payer: MEDICARE

## 2025-04-08 ENCOUNTER — APPOINTMENT (OUTPATIENT)
Dept: ENDOCRINOLOGY | Facility: CLINIC | Age: 85
End: 2025-04-08

## 2025-04-08 ENCOUNTER — TRANSCRIPTION ENCOUNTER (OUTPATIENT)
Age: 85
End: 2025-04-08

## 2025-04-08 VITALS — OXYGEN SATURATION: 96 % | SYSTOLIC BLOOD PRESSURE: 130 MMHG | HEART RATE: 104 BPM | DIASTOLIC BLOOD PRESSURE: 82 MMHG

## 2025-04-08 VITALS — OXYGEN SATURATION: 69 %

## 2025-04-08 DIAGNOSIS — R93.89 ABNORMAL FINDINGS ON DIAGNOSTIC IMAGING OF OTHER SPECIFIED BODY STRUCTURES: ICD-10-CM
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
## 2025-04-08 DIAGNOSIS — R91.1 SOLITARY PULMONARY NODULE: ICD-10-CM

## 2025-04-08 DIAGNOSIS — J43.2 CENTRILOBULAR EMPHYSEMA: ICD-10-CM

## 2025-04-08 PROCEDURE — 94010 BREATHING CAPACITY TEST: CPT

## 2025-04-08 PROCEDURE — 99214 OFFICE O/P EST MOD 30 MIN: CPT | Mod: 25

## 2025-04-10 ENCOUNTER — APPOINTMENT (OUTPATIENT)
Dept: ORTHOPEDIC SURGERY | Facility: CLINIC | Age: 85
End: 2025-04-10
Payer: MEDICARE

## 2025-04-10 VITALS — HEIGHT: 64 IN | WEIGHT: 91 LBS | BODY MASS INDEX: 15.54 KG/M2

## 2025-04-10 DIAGNOSIS — S32.009A UNSPECIFIED FRACTURE OF UNSPECIFIED LUMBAR VERTEBRA, INITIAL ENCOUNTER FOR CLOSED FRACTURE: ICD-10-CM

## 2025-04-10 PROCEDURE — 72100 X-RAY EXAM L-S SPINE 2/3 VWS: CPT

## 2025-04-10 PROCEDURE — 99204 OFFICE O/P NEW MOD 45 MIN: CPT

## 2025-04-14 ENCOUNTER — TRANSCRIPTION ENCOUNTER (OUTPATIENT)
Age: 85
End: 2025-04-14

## 2025-04-17 ENCOUNTER — NON-APPOINTMENT (OUTPATIENT)
Age: 85
End: 2025-04-17

## 2025-04-17 RX ORDER — ACETAMINOPHEN AND CODEINE PHOSPHATE 300; 60 MG/1; MG/1
300-60 TABLET ORAL EVERY 8 HOURS
Qty: 21 | Refills: 0 | Status: ACTIVE | COMMUNITY
Start: 2025-04-08 | End: 1900-01-01

## 2025-04-25 ENCOUNTER — NON-APPOINTMENT (OUTPATIENT)
Age: 85
End: 2025-04-25

## 2025-05-07 ENCOUNTER — APPOINTMENT (OUTPATIENT)
Dept: ORTHOPEDIC SURGERY | Facility: CLINIC | Age: 85
End: 2025-05-07

## 2025-05-09 DIAGNOSIS — N28.1 CYST OF KIDNEY, ACQUIRED: ICD-10-CM

## 2025-05-09 DIAGNOSIS — K59.00 CONSTIPATION, UNSPECIFIED: ICD-10-CM

## 2025-05-09 DIAGNOSIS — R93.3 ABNORMAL FINDINGS ON DIAGNOSTIC IMAGING OF OTHER PARTS OF DIGESTIVE TRACT: ICD-10-CM

## 2025-05-13 ENCOUNTER — APPOINTMENT (OUTPATIENT)
Dept: CARDIOLOGY | Facility: CLINIC | Age: 85
End: 2025-05-13

## 2025-05-23 ENCOUNTER — NON-APPOINTMENT (OUTPATIENT)
Age: 85
End: 2025-05-23

## 2025-06-24 ENCOUNTER — APPOINTMENT (OUTPATIENT)
Dept: ENDOCRINOLOGY | Facility: CLINIC | Age: 85
End: 2025-06-24